# Patient Record
(demographics unavailable — no encounter records)

---

## 2024-11-06 NOTE — PROCEDURE
[de-identified] : Pes Anserine Bursa Cortisone Injection, Left: Verbal consent was obtained from the patient for a left knee injection after the risks and benefits were discussed. The patient was made comfortable in the seated position with the knee at 90 degrees hanging over the table. The patients knee injection site was then sterilely prepped. Local anesthetic was used to desensitize the skin prior to the injection. A sterile 22-guage needle on a sterile syringe was used to introduce the injectable liquid into the point of maximum tenderness around the Pes Anserine Bursa and the needle was moved subcutaneously spreading out the medication throughout the area of pain described by the patient. The needle was withdrawn, and a sterile band-aid was placed over the injection site. Pressure was applied to the injection site for several minutes to help with hemostasis as the injection site. The patient tolerated the procedure well.  Medications injected into the knee: a. Steroid: Celestone 6mg . b. Local anesthetic: Lidocaine1%, Marcaine 0.25%.

## 2024-11-06 NOTE — PHYSICAL EXAM
[de-identified] : Left Knee: tender severely over the pes anserine bursa. No signs of DVT. Good rom of left knee. Mild medial joint line tenderness. Good pulses.

## 2024-11-06 NOTE — PROCEDURE
[de-identified] : Pes Anserine Bursa Cortisone Injection, Left: Verbal consent was obtained from the patient for a left knee injection after the risks and benefits were discussed. The patient was made comfortable in the seated position with the knee at 90 degrees hanging over the table. The patients knee injection site was then sterilely prepped. Local anesthetic was used to desensitize the skin prior to the injection. A sterile 22-guage needle on a sterile syringe was used to introduce the injectable liquid into the point of maximum tenderness around the Pes Anserine Bursa and the needle was moved subcutaneously spreading out the medication throughout the area of pain described by the patient. The needle was withdrawn, and a sterile band-aid was placed over the injection site. Pressure was applied to the injection site for several minutes to help with hemostasis as the injection site. The patient tolerated the procedure well.  Medications injected into the knee: a. Steroid: Celestone 6mg . b. Local anesthetic: Lidocaine1%, Marcaine 0.25%.

## 2024-11-06 NOTE — PHYSICAL EXAM
[de-identified] : Left Knee: tender severely over the pes anserine bursa. No signs of DVT. Good rom of left knee. Mild medial joint line tenderness. Good pulses.

## 2024-11-06 NOTE — DISCUSSION/SUMMARY
[de-identified] : Impression: Left pes bursitis Plan: Injection left knee pes bursa PT Rx given. followup prn.  Right knee is doing well no complaints.

## 2024-11-06 NOTE — HISTORY OF PRESENT ILLNESS
[de-identified] : MadelineAdelita marti is a 78-year-old female who presents today with left knee pain. She is complaining of pain over the left knee pes anserine bursa.

## 2024-11-06 NOTE — DISCUSSION/SUMMARY
[de-identified] : Impression: Left pes bursitis Plan: Injection left knee pes bursa PT Rx given. followup prn.  Right knee is doing well no complaints.

## 2024-11-06 NOTE — HISTORY OF PRESENT ILLNESS
[de-identified] : MadelineAdelita marti is a 78-year-old female who presents today with left knee pain. She is complaining of pain over the left knee pes anserine bursa.